# Patient Record
Sex: FEMALE | Race: WHITE | NOT HISPANIC OR LATINO | URBAN - METROPOLITAN AREA
[De-identification: names, ages, dates, MRNs, and addresses within clinical notes are randomized per-mention and may not be internally consistent; named-entity substitution may affect disease eponyms.]

---

## 2024-04-22 ENCOUNTER — INPATIENT (INPATIENT)
Facility: HOSPITAL | Age: 64
LOS: 0 days | Discharge: HOME | DRG: 282 | End: 2024-04-23
Attending: INTERNAL MEDICINE | Admitting: INTERNAL MEDICINE
Payer: COMMERCIAL

## 2024-04-22 VITALS
HEART RATE: 77 BPM | OXYGEN SATURATION: 96 % | SYSTOLIC BLOOD PRESSURE: 139 MMHG | HEIGHT: 65 IN | WEIGHT: 143.08 LBS | TEMPERATURE: 98 F | DIASTOLIC BLOOD PRESSURE: 84 MMHG | RESPIRATION RATE: 19 BRPM

## 2024-04-22 DIAGNOSIS — I21.4 NON-ST ELEVATION (NSTEMI) MYOCARDIAL INFARCTION: ICD-10-CM

## 2024-04-22 LAB
APTT BLD: 41.3 SEC — HIGH (ref 24.5–35.6)
HCT VFR BLD CALC: 35.2 % — LOW (ref 39–50)
HGB BLD-MCNC: 11.4 G/DL — LOW (ref 13–17)
INR BLD: 1.01 RATIO — SIGNIFICANT CHANGE UP (ref 0.85–1.18)
MCHC RBC-ENTMCNC: 29.3 PG — SIGNIFICANT CHANGE UP (ref 27–34)
MCHC RBC-ENTMCNC: 32.4 GM/DL — SIGNIFICANT CHANGE UP (ref 32–36)
MCV RBC AUTO: 90.5 FL — SIGNIFICANT CHANGE UP (ref 80–100)
PLATELET # BLD AUTO: 272 K/UL — SIGNIFICANT CHANGE UP (ref 150–400)
PROTHROM AB SERPL-ACNC: 11.4 SEC — SIGNIFICANT CHANGE UP (ref 9.5–13)
RBC # BLD: 3.89 M/UL — LOW (ref 4.2–5.8)
RBC # FLD: 12.6 % — SIGNIFICANT CHANGE UP (ref 10.3–14.5)
WBC # BLD: 7.43 K/UL — SIGNIFICANT CHANGE UP (ref 3.8–10.5)
WBC # FLD AUTO: 7.43 K/UL — SIGNIFICANT CHANGE UP (ref 3.8–10.5)

## 2024-04-22 PROCEDURE — C1769: CPT

## 2024-04-22 PROCEDURE — 80061 LIPID PANEL: CPT

## 2024-04-22 PROCEDURE — 86803 HEPATITIS C AB TEST: CPT

## 2024-04-22 PROCEDURE — C1887: CPT

## 2024-04-22 PROCEDURE — 83735 ASSAY OF MAGNESIUM: CPT

## 2024-04-22 PROCEDURE — 93005 ELECTROCARDIOGRAM TRACING: CPT

## 2024-04-22 PROCEDURE — 93010 ELECTROCARDIOGRAM REPORT: CPT

## 2024-04-22 PROCEDURE — 85027 COMPLETE CBC AUTOMATED: CPT

## 2024-04-22 PROCEDURE — 85730 THROMBOPLASTIN TIME PARTIAL: CPT

## 2024-04-22 PROCEDURE — 85025 COMPLETE CBC W/AUTO DIFF WBC: CPT

## 2024-04-22 PROCEDURE — 93458 L HRT ARTERY/VENTRICLE ANGIO: CPT

## 2024-04-22 PROCEDURE — 84443 ASSAY THYROID STIM HORMONE: CPT

## 2024-04-22 PROCEDURE — 36415 COLL VENOUS BLD VENIPUNCTURE: CPT

## 2024-04-22 PROCEDURE — C1894: CPT

## 2024-04-22 PROCEDURE — 85610 PROTHROMBIN TIME: CPT

## 2024-04-22 PROCEDURE — 86900 BLOOD TYPING SEROLOGIC ABO: CPT

## 2024-04-22 PROCEDURE — 71045 X-RAY EXAM CHEST 1 VIEW: CPT

## 2024-04-22 PROCEDURE — 86850 RBC ANTIBODY SCREEN: CPT

## 2024-04-22 PROCEDURE — 84100 ASSAY OF PHOSPHORUS: CPT

## 2024-04-22 PROCEDURE — 84484 ASSAY OF TROPONIN QUANT: CPT

## 2024-04-22 PROCEDURE — 80053 COMPREHEN METABOLIC PANEL: CPT

## 2024-04-22 PROCEDURE — 80048 BASIC METABOLIC PNL TOTAL CA: CPT

## 2024-04-22 PROCEDURE — 82550 ASSAY OF CK (CPK): CPT

## 2024-04-22 PROCEDURE — 86901 BLOOD TYPING SEROLOGIC RH(D): CPT

## 2024-04-22 PROCEDURE — 83036 HEMOGLOBIN GLYCOSYLATED A1C: CPT

## 2024-04-22 PROCEDURE — 71045 X-RAY EXAM CHEST 1 VIEW: CPT | Mod: 26

## 2024-04-22 RX ORDER — ASPIRIN/CALCIUM CARB/MAGNESIUM 324 MG
81 TABLET ORAL DAILY
Refills: 0 | Status: DISCONTINUED | OUTPATIENT
Start: 2024-04-22 | End: 2024-04-23

## 2024-04-22 RX ORDER — ACETAMINOPHEN 500 MG
1000 TABLET ORAL ONCE
Refills: 0 | Status: COMPLETED | OUTPATIENT
Start: 2024-04-22 | End: 2024-04-23

## 2024-04-22 RX ORDER — HEPARIN SODIUM 5000 [USP'U]/ML
4700 INJECTION INTRAVENOUS; SUBCUTANEOUS EVERY 6 HOURS
Refills: 0 | Status: DISCONTINUED | OUTPATIENT
Start: 2024-04-22 | End: 2024-04-23

## 2024-04-22 RX ORDER — ATORVASTATIN CALCIUM 80 MG/1
80 TABLET, FILM COATED ORAL AT BEDTIME
Refills: 0 | Status: DISCONTINUED | OUTPATIENT
Start: 2024-04-23 | End: 2024-04-23

## 2024-04-22 RX ORDER — HEPARIN SODIUM 5000 [USP'U]/ML
4700 INJECTION INTRAVENOUS; SUBCUTANEOUS ONCE
Refills: 0 | Status: DISCONTINUED | OUTPATIENT
Start: 2024-04-22 | End: 2024-04-23

## 2024-04-22 RX ORDER — SERTRALINE 25 MG/1
100 TABLET, FILM COATED ORAL DAILY
Refills: 0 | Status: DISCONTINUED | OUTPATIENT
Start: 2024-04-22 | End: 2024-04-23

## 2024-04-22 RX ORDER — MORPHINE SULFATE 50 MG/1
2 CAPSULE, EXTENDED RELEASE ORAL ONCE
Refills: 0 | Status: DISCONTINUED | OUTPATIENT
Start: 2024-04-22 | End: 2024-04-23

## 2024-04-22 RX ORDER — LEVOTHYROXINE SODIUM 125 MCG
75 TABLET ORAL DAILY
Refills: 0 | Status: DISCONTINUED | OUTPATIENT
Start: 2024-04-23 | End: 2024-04-23

## 2024-04-22 RX ORDER — HEPARIN SODIUM 5000 [USP'U]/ML
INJECTION INTRAVENOUS; SUBCUTANEOUS
Qty: 25000 | Refills: 0 | Status: DISCONTINUED | OUTPATIENT
Start: 2024-04-22 | End: 2024-04-23

## 2024-04-22 NOTE — H&P ADULT - ASSESSMENT
A:    64yF    Here for:    1. NSTEMI    This patient requires a higher level of care due to the complexity and severity of their condition which increases the amount and complexity of data to be reviewed and analyzed in addition to the risk of complications, morbidity and mortality of patient management    P:    NSTEMI  XF from outside hospital  Cards aware, tentative plan for catheterization tomorrow    STAT labs, CXR  Baseline EKG w/o KACEY or STEMI equivalents  Antiplatelets; try to ascertain what medications she rec'd at outside hospital  Morphine 2mg IV x 1, ofirmev for pain relief  Trend troponins  AM lipid panel, HbA1c  No known hx of heart disease; never had echo or catheterization  Hx of HTN and HLD, not on any medications for this  Heparin drip  Minimize invasive lines/catheters as much as possible  Replete lytes PRN    Dispo: Cont care in CCU. Ischemic w/u per Cardiology.     Time spent on this patient encounter: 75+ minutes    Date of entry of this note is equal to the date of services rendered.    This includes assessment of the presenting problems with associated risks, reviewing the medical record to prepare for the encounter and meeting face to face with the patient to obtain additional history and conduct a focused examination I have also performed an appropriate physical exam, made interventions listed and ordered and interpreted appropriate diagnostic studies as documented. This also included communication and coordination of care with the multidisciplinary team including the bedside nurse, appropriate attending of record and consultants as needed.         A:    64yF    Here for:    1. NSTEMI    This patient requires a higher level of care due to the complexity and severity of their condition which increases the amount and complexity of data to be reviewed and analyzed in addition to the risk of complications, morbidity and mortality of patient management    P:    NSTEMI  XF from outside hospital  Cards aware, tentative plan for catheterization tomorrow    STAT labs, CXR  Baseline EKG w/o KACEY or STEMI equivalents  ASA 325m plavix 300mg at outside hospital; cont ASA 81mg PO qd  Morphine 2mg IV x 1, ofirmev for pain relief  Trend troponins  AM lipid panel, HbA1c  No known hx of heart disease; never had echo or catheterization  Hx of HTN and HLD, not on any medications for this  Heparin drip  Minimize invasive lines/catheters as much as possible  Replete lytes PRN    Dispo: Cont care in CCU. Ischemic w/u per Cardiology.     Time spent on this patient encounter: 75+ minutes    Date of entry of this note is equal to the date of services rendered.    This includes assessment of the presenting problems with associated risks, reviewing the medical record to prepare for the encounter and meeting face to face with the patient to obtain additional history and conduct a focused examination I have also performed an appropriate physical exam, made interventions listed and ordered and interpreted appropriate diagnostic studies as documented. This also included communication and coordination of care with the multidisciplinary team including the bedside nurse, appropriate attending of record and consultants as needed.

## 2024-04-22 NOTE — H&P ADULT - HISTORY OF PRESENT ILLNESS
S:    Pt seen and examined  HD # 1  PMHx HTN, HLD, fibromyalgia  Pt presented to [facility] with c/o of chest pain; Dx NSTEMI, no interventionalist on call, XF to   Here states CP resolved, c/o of upper back pain and HA; she rec'd a nitroglycerin oral at [facility]

## 2024-04-22 NOTE — PATIENT PROFILE ADULT - FALL HARM RISK - UNIVERSAL INTERVENTIONS
Bed in lowest position, wheels locked, appropriate side rails in place/Call bell, personal items and telephone in reach/Instruct patient to call for assistance before getting out of bed or chair/Non-slip footwear when patient is out of bed/[place] to call system/Physically safe environment - no spills, clutter or unnecessary equipment/Purposeful Proactive Rounding/Room/bathroom lighting operational, light cord in reach

## 2024-04-22 NOTE — PATIENT PROFILE ADULT - STATED REASON FOR ADMISSION
"I was making coffee and all of a sudden had pain on left side of my chest and in the back and my left shoulder started feeling weird, tight"

## 2024-04-23 ENCOUNTER — TRANSCRIPTION ENCOUNTER (OUTPATIENT)
Age: 64
End: 2024-04-23

## 2024-04-23 VITALS — TEMPERATURE: 98 F

## 2024-04-23 DIAGNOSIS — I21.4 NON-ST ELEVATION (NSTEMI) MYOCARDIAL INFARCTION: ICD-10-CM

## 2024-04-23 LAB
A1C WITH ESTIMATED AVERAGE GLUCOSE RESULT: 5.8 % — HIGH (ref 4–5.6)
ABO RH CONFIRMATION: SIGNIFICANT CHANGE UP
ALBUMIN SERPL ELPH-MCNC: 3.5 G/DL — SIGNIFICANT CHANGE UP (ref 3.3–5)
ALP SERPL-CCNC: 65 U/L — SIGNIFICANT CHANGE UP (ref 40–120)
ALT FLD-CCNC: 23 U/L — SIGNIFICANT CHANGE UP (ref 12–78)
ANION GAP SERPL CALC-SCNC: 5 MMOL/L — SIGNIFICANT CHANGE UP (ref 5–17)
ANION GAP SERPL CALC-SCNC: 5 MMOL/L — SIGNIFICANT CHANGE UP (ref 5–17)
APTT BLD: 58.9 SEC — HIGH (ref 24.5–35.6)
APTT BLD: 88.9 SEC — HIGH (ref 24.5–35.6)
AST SERPL-CCNC: 52 U/L — HIGH (ref 15–37)
BASOPHILS # BLD AUTO: 0.02 K/UL — SIGNIFICANT CHANGE UP (ref 0–0.2)
BASOPHILS NFR BLD AUTO: 0.3 % — SIGNIFICANT CHANGE UP (ref 0–2)
BILIRUB SERPL-MCNC: 0.5 MG/DL — SIGNIFICANT CHANGE UP (ref 0.2–1.2)
BLD GP AB SCN SERPL QL: SIGNIFICANT CHANGE UP
BUN SERPL-MCNC: 11 MG/DL — SIGNIFICANT CHANGE UP (ref 7–23)
BUN SERPL-MCNC: 12 MG/DL — SIGNIFICANT CHANGE UP (ref 7–23)
CALCIUM SERPL-MCNC: 9.2 MG/DL — SIGNIFICANT CHANGE UP (ref 8.5–10.1)
CALCIUM SERPL-MCNC: 9.3 MG/DL — SIGNIFICANT CHANGE UP (ref 8.5–10.1)
CHLORIDE SERPL-SCNC: 109 MMOL/L — HIGH (ref 96–108)
CHLORIDE SERPL-SCNC: 109 MMOL/L — HIGH (ref 96–108)
CHOLEST SERPL-MCNC: 237 MG/DL — HIGH
CK SERPL-CCNC: 327 U/L — HIGH (ref 26–308)
CO2 SERPL-SCNC: 27 MMOL/L — SIGNIFICANT CHANGE UP (ref 22–31)
CO2 SERPL-SCNC: 27 MMOL/L — SIGNIFICANT CHANGE UP (ref 22–31)
CREAT SERPL-MCNC: 0.7 MG/DL — SIGNIFICANT CHANGE UP (ref 0.5–1.3)
CREAT SERPL-MCNC: 0.7 MG/DL — SIGNIFICANT CHANGE UP (ref 0.5–1.3)
EGFR: 104 ML/MIN/1.73M2 — SIGNIFICANT CHANGE UP
EGFR: 104 ML/MIN/1.73M2 — SIGNIFICANT CHANGE UP
EOSINOPHIL # BLD AUTO: 0.08 K/UL — SIGNIFICANT CHANGE UP (ref 0–0.5)
EOSINOPHIL NFR BLD AUTO: 1.3 % — SIGNIFICANT CHANGE UP (ref 0–6)
ESTIMATED AVERAGE GLUCOSE: 120 MG/DL — HIGH (ref 68–114)
GLUCOSE SERPL-MCNC: 103 MG/DL — HIGH (ref 70–99)
GLUCOSE SERPL-MCNC: 120 MG/DL — HIGH (ref 70–99)
HCT VFR BLD CALC: 36.2 % — LOW (ref 39–50)
HCV AB S/CO SERPL IA: 0.17 S/CO — SIGNIFICANT CHANGE UP (ref 0–0.99)
HCV AB SERPL-IMP: SIGNIFICANT CHANGE UP
HDLC SERPL-MCNC: 43 MG/DL — LOW
HGB BLD-MCNC: 11.7 G/DL — LOW (ref 13–17)
IMM GRANULOCYTES NFR BLD AUTO: 0.6 % — SIGNIFICANT CHANGE UP (ref 0–0.9)
LIPID PNL WITH DIRECT LDL SERPL: 157 MG/DL — HIGH
LYMPHOCYTES # BLD AUTO: 1.91 K/UL — SIGNIFICANT CHANGE UP (ref 1–3.3)
LYMPHOCYTES # BLD AUTO: 30.6 % — SIGNIFICANT CHANGE UP (ref 13–44)
MAGNESIUM SERPL-MCNC: 2 MG/DL — SIGNIFICANT CHANGE UP (ref 1.6–2.6)
MCHC RBC-ENTMCNC: 29.4 PG — SIGNIFICANT CHANGE UP (ref 27–34)
MCHC RBC-ENTMCNC: 32.3 GM/DL — SIGNIFICANT CHANGE UP (ref 32–36)
MCV RBC AUTO: 91 FL — SIGNIFICANT CHANGE UP (ref 80–100)
MONOCYTES # BLD AUTO: 0.42 K/UL — SIGNIFICANT CHANGE UP (ref 0–0.9)
MONOCYTES NFR BLD AUTO: 6.7 % — SIGNIFICANT CHANGE UP (ref 2–14)
NEUTROPHILS # BLD AUTO: 3.78 K/UL — SIGNIFICANT CHANGE UP (ref 1.8–7.4)
NEUTROPHILS NFR BLD AUTO: 60.5 % — SIGNIFICANT CHANGE UP (ref 43–77)
NON HDL CHOLESTEROL: 194 MG/DL — HIGH
PHOSPHATE SERPL-MCNC: 2.9 MG/DL — SIGNIFICANT CHANGE UP (ref 2.5–4.5)
PLATELET # BLD AUTO: 273 K/UL — SIGNIFICANT CHANGE UP (ref 150–400)
POTASSIUM SERPL-MCNC: 3.4 MMOL/L — LOW (ref 3.5–5.3)
POTASSIUM SERPL-MCNC: 4.3 MMOL/L — SIGNIFICANT CHANGE UP (ref 3.5–5.3)
POTASSIUM SERPL-SCNC: 3.4 MMOL/L — LOW (ref 3.5–5.3)
POTASSIUM SERPL-SCNC: 4.3 MMOL/L — SIGNIFICANT CHANGE UP (ref 3.5–5.3)
PROT SERPL-MCNC: 7 GM/DL — SIGNIFICANT CHANGE UP (ref 6–8.3)
RBC # BLD: 3.98 M/UL — LOW (ref 4.2–5.8)
RBC # FLD: 12.8 % — SIGNIFICANT CHANGE UP (ref 10.3–14.5)
SODIUM SERPL-SCNC: 141 MMOL/L — SIGNIFICANT CHANGE UP (ref 135–145)
SODIUM SERPL-SCNC: 141 MMOL/L — SIGNIFICANT CHANGE UP (ref 135–145)
TRIGL SERPL-MCNC: 202 MG/DL — HIGH
TROPONIN I, HIGH SENSITIVITY RESULT: 6468.51 NG/L — HIGH
TROPONIN I, HIGH SENSITIVITY RESULT: 8608.02 NG/L — HIGH
TSH SERPL-MCNC: 7.31 UU/ML — HIGH (ref 0.34–4.82)
WBC # BLD: 6.25 K/UL — SIGNIFICANT CHANGE UP (ref 3.8–10.5)
WBC # FLD AUTO: 6.25 K/UL — SIGNIFICANT CHANGE UP (ref 3.8–10.5)

## 2024-04-23 PROCEDURE — 99233 SBSQ HOSP IP/OBS HIGH 50: CPT

## 2024-04-23 PROCEDURE — 93458 L HRT ARTERY/VENTRICLE ANGIO: CPT | Mod: 26

## 2024-04-23 PROCEDURE — 99291 CRITICAL CARE FIRST HOUR: CPT | Mod: 24

## 2024-04-23 PROCEDURE — 99152 MOD SED SAME PHYS/QHP 5/>YRS: CPT | Mod: 59

## 2024-04-23 RX ORDER — PITAVASTATIN CALCIUM 1.04 MG/1
1 TABLET, FILM COATED ORAL
Refills: 0 | DISCHARGE

## 2024-04-23 RX ORDER — CHLORHEXIDINE GLUCONATE 213 G/1000ML
1 SOLUTION TOPICAL
Refills: 0 | Status: DISCONTINUED | OUTPATIENT
Start: 2024-04-23 | End: 2024-04-23

## 2024-04-23 RX ORDER — METOPROLOL TARTRATE 50 MG
12.5 TABLET ORAL
Refills: 0 | Status: DISCONTINUED | OUTPATIENT
Start: 2024-04-23 | End: 2024-04-23

## 2024-04-23 RX ORDER — LEVOTHYROXINE SODIUM 125 MCG
1 TABLET ORAL
Refills: 0 | DISCHARGE

## 2024-04-23 RX ORDER — HEPARIN SODIUM 5000 [USP'U]/ML
3800 INJECTION INTRAVENOUS; SUBCUTANEOUS EVERY 6 HOURS
Refills: 0 | Status: DISCONTINUED | OUTPATIENT
Start: 2024-04-23 | End: 2024-04-23

## 2024-04-23 RX ORDER — POTASSIUM CHLORIDE 20 MEQ
10 PACKET (EA) ORAL
Refills: 0 | Status: DISCONTINUED | OUTPATIENT
Start: 2024-04-23 | End: 2024-04-23

## 2024-04-23 RX ORDER — SODIUM CHLORIDE 9 MG/ML
1000 INJECTION INTRAMUSCULAR; INTRAVENOUS; SUBCUTANEOUS
Refills: 0 | Status: DISCONTINUED | OUTPATIENT
Start: 2024-04-23 | End: 2024-04-23

## 2024-04-23 RX ORDER — HEPARIN SODIUM 5000 [USP'U]/ML
3800 INJECTION INTRAVENOUS; SUBCUTANEOUS ONCE
Refills: 0 | Status: COMPLETED | OUTPATIENT
Start: 2024-04-23 | End: 2024-04-23

## 2024-04-23 RX ORDER — CLOPIDOGREL BISULFATE 75 MG/1
300 TABLET, FILM COATED ORAL ONCE
Refills: 0 | Status: COMPLETED | OUTPATIENT
Start: 2024-04-23 | End: 2024-04-23

## 2024-04-23 RX ORDER — AMLODIPINE BESYLATE 2.5 MG/1
5 TABLET ORAL DAILY
Refills: 0 | Status: DISCONTINUED | OUTPATIENT
Start: 2024-04-23 | End: 2024-04-23

## 2024-04-23 RX ORDER — AMLODIPINE BESYLATE 2.5 MG/1
1 TABLET ORAL
Qty: 30 | Refills: 3
Start: 2024-04-23

## 2024-04-23 RX ORDER — POTASSIUM CHLORIDE 20 MEQ
40 PACKET (EA) ORAL EVERY 4 HOURS
Refills: 0 | Status: COMPLETED | OUTPATIENT
Start: 2024-04-23 | End: 2024-04-23

## 2024-04-23 RX ORDER — SERTRALINE 25 MG/1
1 TABLET, FILM COATED ORAL
Qty: 0 | Refills: 0 | DISCHARGE

## 2024-04-23 RX ORDER — SODIUM CHLORIDE 9 MG/ML
250 INJECTION INTRAMUSCULAR; INTRAVENOUS; SUBCUTANEOUS ONCE
Refills: 0 | Status: DISCONTINUED | OUTPATIENT
Start: 2024-04-23 | End: 2024-04-23

## 2024-04-23 RX ORDER — HEPARIN SODIUM 5000 [USP'U]/ML
INJECTION INTRAVENOUS; SUBCUTANEOUS
Qty: 25000 | Refills: 0 | Status: DISCONTINUED | OUTPATIENT
Start: 2024-04-23 | End: 2024-04-23

## 2024-04-23 RX ADMIN — HEPARIN SODIUM 3800 UNIT(S): 5000 INJECTION INTRAVENOUS; SUBCUTANEOUS at 01:10

## 2024-04-23 RX ADMIN — HEPARIN SODIUM 0 UNIT(S)/HR: 5000 INJECTION INTRAVENOUS; SUBCUTANEOUS at 08:08

## 2024-04-23 RX ADMIN — MORPHINE SULFATE 2 MILLIGRAM(S): 50 CAPSULE, EXTENDED RELEASE ORAL at 00:55

## 2024-04-23 RX ADMIN — HEPARIN SODIUM 750 UNIT(S)/HR: 5000 INJECTION INTRAVENOUS; SUBCUTANEOUS at 07:08

## 2024-04-23 RX ADMIN — Medication 40 MILLIEQUIVALENT(S): at 05:15

## 2024-04-23 RX ADMIN — MORPHINE SULFATE 2 MILLIGRAM(S): 50 CAPSULE, EXTENDED RELEASE ORAL at 02:00

## 2024-04-23 RX ADMIN — AMLODIPINE BESYLATE 5 MILLIGRAM(S): 2.5 TABLET ORAL at 17:59

## 2024-04-23 RX ADMIN — Medication 40 MILLIEQUIVALENT(S): at 02:11

## 2024-04-23 RX ADMIN — HEPARIN SODIUM 750 UNIT(S)/HR: 5000 INJECTION INTRAVENOUS; SUBCUTANEOUS at 01:01

## 2024-04-23 RX ADMIN — HEPARIN SODIUM 650 UNIT(S)/HR: 5000 INJECTION INTRAVENOUS; SUBCUTANEOUS at 08:40

## 2024-04-23 RX ADMIN — SERTRALINE 100 MILLIGRAM(S): 25 TABLET, FILM COATED ORAL at 09:56

## 2024-04-23 RX ADMIN — Medication 75 MICROGRAM(S): at 05:15

## 2024-04-23 RX ADMIN — Medication 12.5 MILLIGRAM(S): at 09:28

## 2024-04-23 RX ADMIN — CLOPIDOGREL BISULFATE 300 MILLIGRAM(S): 75 TABLET, FILM COATED ORAL at 09:29

## 2024-04-23 RX ADMIN — Medication 81 MILLIGRAM(S): at 09:29

## 2024-04-23 RX ADMIN — SODIUM CHLORIDE 130 MILLILITER(S): 9 INJECTION INTRAMUSCULAR; INTRAVENOUS; SUBCUTANEOUS at 17:10

## 2024-04-23 RX ADMIN — Medication 1000 MILLIGRAM(S): at 02:00

## 2024-04-23 RX ADMIN — Medication 400 MILLIGRAM(S): at 01:14

## 2024-04-23 NOTE — DISCHARGE NOTE NURSING/CASE MANAGEMENT/SOCIAL WORK - NSDCPEFALRISK_GEN_ALL_CORE
For information on Fall & Injury Prevention, visit: [email or web address] OR  [email or web address] OR  https://www.cdc.gov/steadi/patient.html

## 2024-04-23 NOTE — DISCHARGE NOTE PROVIDER - NSDCFUADDINST_GEN_ALL_CORE_FT
No submerging hand in water for 5 days, you may shower tomorrow afternoon.   No swimming, hot tub for 5 days  Prior to showering, remove bandage. Do not scrub procedure site, pat dry if it gets wet  you may cover it with a small bandaid.   Follow up with the cardiologist of your choice within two weeks, I have given you the cards of Dr Pena and Dr Palla   Because you received sedation for the next 24 hours:  do not drive   Do not make important decisions or operate heavy machinery   Do not drink alcohol or use illicit drugs

## 2024-04-23 NOTE — PROGRESS NOTE ADULT - SUBJECTIVE AND OBJECTIVE BOX
Nurse Practitioner Progress note:     HPI:  64 y/r old f with PMH HLD, HTN,. fibromyalgia presented to [facility] with c/o of chest pain; Dx NSTEMI, no interventionalist on call, XF to   Here states CP resolved, c/o of upper back pain and HA; she rec'd a nitroglycerin oral at [facility]   (22 Apr 2024 23:22)    4/23/24 Dr Leigh accepted pt from [facility] for NSTEMI with a peak troponin of 8600 now downtrending on heparin gtts. Pt seen in CCU, pt is A&Ox3, states that her chest pain and back pain has improved since being admitted to . Pt had chest pain  and upper back pain with left arm pain that made her go to the hospital.  No other complaints at this time. Pt is agreeable to procedure today for ischemic evaluation.    (22 Apr 2024 23:22)    S/P LHC: normal cors, coronary spasm        T(C): 36.2 (04-23-24 @ 14:39), Max: 36.9 (04-22-24 @ 22:35)  HR: 66 (04-23-24 @ 14:39) (60 - 77)  BP: 153/99 (04-23-24 @ 14:39) (100/68 - 153/99)  RR: 15 (04-23-24 @ 14:39) (12 - 28)  SpO2: 96% (04-23-24 @ 14:39) (91% - 97%)  Wt(kg): --        PHYSICAL EXAM:  NEURO: Non-focal, AxOx3.  No neuro deficits NECK: Supple, No JVD, No LAD  CHEST/LUNG: Clear to auscultation bilaterally; No wheeze  HEART: s1 s2 Regular rate and rhythm; No murmurs, rubs, or gallops  ABDOMEN: Soft, Nontender, Nondistended; Bowel sounds present X 4 quadrants   EXTREMITIES:  2+ Peripheral Pulses, No clubbing, cyanosis, or edema   VASCULAR: Peripheral pulses palpable 2+ bilaterally  PROCEDURE SITE: right band removed one hour post placement ,Site is without hematoma or bleeding. Sensation and ARTURO intact. Distal pulses palpable 2+, capillary refill < 2 seconds. Patient denies pain, numbness, tingling, CP or SOB. Clean dry dressing applied     PROCEDURE RESULTS: full report to follow     ASSESSMENT:   Dr Leigh accepted pt from [facility] for NSTEMI with a peak troponin of 8600 now downtrending on heparin gtts. Pt seen in CCU, pt is A&Ox3, states that her chest pain and back pain has improved since being admitted to . Pt had chest pain  and upper back pain with left arm pain that made her go to the hospital.  No other complaints at this time. Pt is agreeable to procedure today for ischemic evaluation.    (22 Apr 2024 23:22)    S/P LHC: normal cors, coronary spasm      PLAN:  -VS, diet, activity as per post cath orders  -JOSELIN post hydration  -DC heparin   -add Amlodipine 5mg daily  -Plan of care discussed with patient and Dr Leigh  -Post cath instructions reviewed, patient verbalizes and understands instructions  Remainder of care to be addressed by primary team          
  CC:    HPI:  Pt presented to [facility] with c/o of chest pain; Dx NSTEMI, no interventionalist on call, transferred to  for [facility].      4/23: No events over night.  For [facility]          PAST MEDICAL & SURGICAL HISTORY:  HTN (hypertension)      HLD (hyperlipidemia)      H/O fibromyalgia          FAMILY HISTORY:      Social Hx:    Allergies    No Known Allergies    Intolerances        Height (cm): 165.1 (04-22 @ 22:35)  Weight (kg): 64.9 (04-23 @ 00:33)  BMI (kg/m2): 23.8 (04-23 @ 00:33)    ICU Vital Signs Last 24 Hrs  T(C): 36.4 (23 Apr 2024 05:00), Max: 36.9 (22 Apr 2024 22:35)  T(F): 97.5 (23 Apr 2024 05:00), Max: 98.4 (22 Apr 2024 22:35)  HR: 69 (23 Apr 2024 09:00) (65 - 77)  BP: 128/76 (23 Apr 2024 09:00) (100/68 - 146/92)  BP(mean): 93 (23 Apr 2024 09:00) (80 - 110)  ABP: --  ABP(mean): --  RR: 12 (23 Apr 2024 09:00) (12 - 28)  SpO2: 97% (23 Apr 2024 09:00) (91% - 97%)    O2 Parameters below as of 23 Apr 2024 09:00  Patient On (Oxygen Delivery Method): room air                I&O's Summary    22 Apr 2024 07:01  -  23 Apr 2024 07:00  --------------------------------------------------------  IN: 100 mL / OUT: 0 mL / NET: 100 mL                              11.7   6.25  )-----------( 273      ( 23 Apr 2024 07:28 )             36.2       04-23    141  |  109<H>  |  11  ----------------------------<  103<H>  4.3   |  27  |  0.70    Ca    9.2      23 Apr 2024 07:28  Phos  2.9     04-22  Mg     2.0     04-22    TPro  7.0  /  Alb  3.5  /  TBili  0.5  /  DBili  x   /  AST  52<H>  /  ALT  23  /  AlkPhos  65  04-22      CARDIAC MARKERS ( 22 Apr 2024 23:26 )  x     / x     / 327 U/L / x     / x                Urinalysis Basic - ( 23 Apr 2024 07:28 )    Color: x / Appearance: x / SG: x / pH: x  Gluc: 103 mg/dL / Ketone: x  / Bili: x / Urobili: x   Blood: x / Protein: x / Nitrite: x   Leuk Esterase: x / RBC: x / WBC x   Sq Epi: x / Non Sq Epi: x / Bacteria: x        MEDICATIONS  (STANDING):  aspirin  chewable 81 milliGRAM(s) Oral daily  atorvastatin 80 milliGRAM(s) Oral at bedtime  heparin  Infusion.  Unit(s)/Hr (7.5 mL/Hr) IV Continuous <Continuous>  levothyroxine 75 MICROGram(s) Oral daily  metoprolol tartrate 12.5 milliGRAM(s) Oral two times a day  sertraline 100 milliGRAM(s) Oral daily  sodium chloride 0.9% Bolus 250 milliLiter(s) IV Bolus once    MEDICATIONS  (PRN):  heparin   Injectable 3800 Unit(s) IV Push every 6 hours PRN For aPTT less than 40      DVT Prophylaxis: UFH    Advanced Directives:  Discussed with:    Visit Information:    ** Time is exclusive of billed procedures and/or teaching and/or routine family updates.

## 2024-04-23 NOTE — DISCHARGE NOTE PROVIDER - NSDCCONDITION_GEN_ALL_CORE
Spoke to pt in regards to if pt viewed results on portal as message was sent that pt has not viewed yet. Pt advised is aware of lab results and instructions. Pt verbalized understanding with no further questions or concerns.    Stable

## 2024-04-23 NOTE — DISCHARGE NOTE PROVIDER - CARE PROVIDER_API CALL
Gissel Pena  Cardiology  [address] [place] [id number]  Phone: [phone]  Fax: [phone]  Follow Up Time:

## 2024-04-23 NOTE — CONSULT NOTE ADULT - ASSESSMENT
Pt with NSTEMI, chest pain/back pain now resolved, currently  on heparin gtts will be brought to the cath lab for ischemic evaluation.    Pt with NSTEMI, chest pain/back pain now resolved, currently  on heparin gtts will be brought to the cath lab for ischemic evaluation.     ASA class: II  Creatinine: 0.70  GFR: 104  Bleeding  Risk score: 2.5%  Gurinder Score:  1pt

## 2024-04-23 NOTE — PROGRESS NOTE ADULT - ASSESSMENT
A/P: 64 female presenting with a NSTEMI and going for a LHC  HTN  HLD    Plan:  CCU  For C  Continue UFH, ASA, BBX, Statin  Synthroid for hypothyroidism  NPO  SQH DVT Prophylaxis

## 2024-04-23 NOTE — DISCHARGE NOTE PROVIDER - HOSPITAL COURSE
Pt transferred to [facility] CCU from [facility] for NSTEMI with chest pain.   Cardiac cath performed on 4/23/24. normal cors with possible arterial spasm  IV hydration was given  Heparin gtts   treatment for HLD, hypothryroidism, fibromyalgia continued  Amlodipine started for coronary spasm  discussions on lifestyle changes, education on cardiac health provided  Pt will be discharged from CCU to home

## 2024-04-23 NOTE — DISCHARGE NOTE NURSING/CASE MANAGEMENT/SOCIAL WORK - PATIENT PORTAL LINK FT
You can access the FollowMyHealth Patient Portal offered by [facility] by registering at the following website: [email or web address]. By joining [employer]’s FollowMyHealth portal, you will also be able to view your health information using other applications (apps) compatible with our system.

## 2024-04-23 NOTE — DISCHARGE NOTE PROVIDER - NSDCMRMEDTOKEN_GEN_ALL_CORE_FT
amLODIPine 5 mg oral tablet: 1 tab(s) orally once a day  pitavastatin (as calcium) 1 mg oral tablet: 1 tab(s) orally once a day (at bedtime)  Synthroid 75 mcg (0.075 mg) oral tablet: 1 tab(s) orally once a day  Zoloft 100 mg oral tablet: 1 tab(s) orally once a day

## 2024-04-23 NOTE — CONSULT NOTE ADULT - SUBJECTIVE AND OBJECTIVE BOX
CHIEF COMPLAINT:  Patient is a 64y old  Female who presents with a chief complaint of NSTEMI (23 Apr 2024 09:46)    HPI:   PMHx HTN, HLD, fibromyalgia  Pt presented to [facility] with c/o of chest pain; Dx NSTEMI, no interventionalist on call, XF to   Here states CP resolved, c/o of upper back pain and HA; she rec'd a nitroglycerin oral at [facility]   (22 Apr 2024 23:22)    4/23/24 Dr Leigh accepted pt from [facility] for NSTEMI with a peak troponin of 8600 now downtrending on heparin gtts. Pt seen in CCU, pt is A&Ox3, states that her chest pain and back pain has improved since being admitted to . Pt had chest pain  and upper back pain with left arm pain that made her go to the hospital.  No other complaints at this time. Pt is agreeable to procedure today for ischemic evaluation.     MEDICATIONS  (STANDING):  aspirin  chewable 81 milliGRAM(s) Oral daily  atorvastatin 80 milliGRAM(s) Oral at bedtime  heparin  Infusion.  Unit(s)/Hr (7.5 mL/Hr) IV Continuous <Continuous>  levothyroxine 75 MICROGram(s) Oral daily  metoprolol tartrate 12.5 milliGRAM(s) Oral two times a day  sertraline 100 milliGRAM(s) Oral daily  sodium chloride 0.9% Bolus 250 milliLiter(s) IV Bolus once    MEDICATIONS  (PRN):  heparin   Injectable 3800 Unit(s) IV Push every 6 hours PRN For aPTT less than 40      PHYSICAL EXAM:  Vital Signs Last 24 Hrs  T(C): 36.4 (23 Apr 2024 05:00), Max: 36.9 (22 Apr 2024 22:35)  T(F): 97.5 (23 Apr 2024 05:00), Max: 98.4 (22 Apr 2024 22:35)  HR: 70 (23 Apr 2024 10:00) (65 - 77)  BP: 123/82 (23 Apr 2024 10:00) (100/68 - 146/92)  BP(mean): 95 (23 Apr 2024 10:00) (80 - 110)  RR: 14 (23 Apr 2024 10:00) (12 - 28)  SpO2: 97% (23 Apr 2024 10:00) (91% - 97%)    Parameters below as of 23 Apr 2024 10:00  Patient On (Oxygen Delivery Method): room air        Constitutional: NAD, awake and alert  HEENT: PERR, EOMI, Normal Hearing, MMM  Neck: Soft and supple, No LAD, No JVD  Respiratory: Breath sounds are clear bilaterally, No wheezing, rales or rhonchi  Cardiovascular: S1 and S2, regular rate and rhythm, no Murmurs, gallops or rubs  Gastrointestinal: Bowel Sounds present, soft, nontender, nondistended, no guarding, no rebound  Extremities: No peripheral edema  Vascular: 2+ peripheral pulses  Neurological: A/O x 3, no focal deficits  Musculoskeletal: 5/5 strength b/l upper and lower extremities  Skin: No rashes    =======================================    ECG:      ========================================    LABS:                        11.7   6.25  )-----------( 273      ( 23 Apr 2024 07:28 )             36.2     04-23    141  |  109<H>  |  11  ----------------------------<  103<H>  4.3   |  27  |  0.70    Ca    9.2      23 Apr 2024 07:28  Phos  2.9     04-22  Mg     2.0     04-22    TPro  7.0  /  Alb  3.5  /  TBili  0.5  /  DBili  x   /  AST  52<H>  /  ALT  23  /  AlkPhos  65  04-22    CARDIAC MARKERS ( 22 Apr 2024 23:26 )  Troponin I, High Sensitivity Result: 8608.02 (04.22.24 @ 23:26)  Troponin I, High Sensitivity Result: 6468.51 (04.23.24 @ 07:28)    PT/INR - ( 22 Apr 2024 23:26 )   PT: 11.4 sec;   INR: 1.01 ratio         PTT - ( 23 Apr 2024 07:28 )  PTT:88.9 sec  Urinalysis Basic - ( 23 Apr 2024 07:28 )    Color: x / Appearance: x / SG: x / pH: x  Gluc: 103 mg/dL / Ketone: x  / Bili: x / Urobili: x   Blood: x / Protein: x / Nitrite: x   Leuk Esterase: x / RBC: x / WBC x   Sq Epi: x / Non Sq Epi: x / Bacteria: x      I&O's Summary    22 Apr 2024 07:01  -  23 Apr 2024 07:00  --------------------------------------------------------  IN: 100 mL / OUT: 0 mL / NET: 100 mL

## 2024-04-23 NOTE — CONSULT NOTE ADULT - PROBLEM SELECTOR RECOMMENDATION 9
a/w chest pain  admit to CCU  heparin gtts  Plavix 300mg to add to the 300mg already given at [facility]  asa loaded at [facility]  continue home meds  JOSELIN prehydration  C with possible PCI  -Consent obtained for cardiac catheterization w/ coronary angiogram, possible sedation and/or analgesia and possible stent placement. Pt is competent, has capacity, and understands risks and benefits of procedure. Risks and benefits discussed. Risk discussed included, but not limited to MI, stroke, mortality, major bleeding, arrythmia, or infection. All questions answered

## 2024-04-23 NOTE — DISCHARGE NOTE PROVIDER - NSDCCPCAREPLAN_GEN_ALL_CORE_FT
PRINCIPAL DISCHARGE DIAGNOSIS  Diagnosis: Coronary artery spasm  Assessment and Plan of Treatment: amlodipine 5mg, follow up with cardiologist      SECONDARY DISCHARGE DIAGNOSES  Diagnosis: HLD (hyperlipidemia)  Assessment and Plan of Treatment: continue home medication

## 2024-04-24 DIAGNOSIS — M79.7 FIBROMYALGIA: ICD-10-CM

## 2024-04-24 PROBLEM — E78.5 HYPERLIPIDEMIA, UNSPECIFIED: Chronic | Status: ACTIVE | Noted: 2024-04-22

## 2024-04-24 PROBLEM — I10 ESSENTIAL (PRIMARY) HYPERTENSION: Chronic | Status: ACTIVE | Noted: 2024-04-22

## 2024-04-24 PROBLEM — Z87.39 PERSONAL HISTORY OF OTHER DISEASES OF THE MUSCULOSKELETAL SYSTEM AND CONNECTIVE TISSUE: Chronic | Status: ACTIVE | Noted: 2024-04-22

## 2024-04-24 PROBLEM — Z00.00 ENCOUNTER FOR PREVENTIVE HEALTH EXAMINATION: Status: ACTIVE | Noted: 2024-04-24

## 2024-04-25 ENCOUNTER — APPOINTMENT (OUTPATIENT)
Dept: CARDIOLOGY | Facility: CLINIC | Age: 64
End: 2024-04-25
Payer: COMMERCIAL

## 2024-04-25 VITALS
OXYGEN SATURATION: 97 % | RESPIRATION RATE: 16 BRPM | HEART RATE: 105 BPM | BODY MASS INDEX: 32.96 KG/M2 | HEIGHT: 63 IN | TEMPERATURE: 98.2 F | DIASTOLIC BLOOD PRESSURE: 70 MMHG | WEIGHT: 186 LBS | SYSTOLIC BLOOD PRESSURE: 112 MMHG

## 2024-04-25 DIAGNOSIS — Z82.49 FAMILY HISTORY OF ISCHEMIC HEART DISEASE AND OTHER DISEASES OF THE CIRCULATORY SYSTEM: ICD-10-CM

## 2024-04-25 DIAGNOSIS — R06.83 SNORING: ICD-10-CM

## 2024-04-25 DIAGNOSIS — F32.A ANXIETY DISORDER, UNSPECIFIED: ICD-10-CM

## 2024-04-25 DIAGNOSIS — Z86.39 PERSONAL HISTORY OF OTHER ENDOCRINE, NUTRITIONAL AND METABOLIC DISEASE: ICD-10-CM

## 2024-04-25 DIAGNOSIS — F41.9 ANXIETY DISORDER, UNSPECIFIED: ICD-10-CM

## 2024-04-25 DIAGNOSIS — Z78.9 OTHER SPECIFIED HEALTH STATUS: ICD-10-CM

## 2024-04-25 DIAGNOSIS — R79.89 OTHER SPECIFIED ABNORMAL FINDINGS OF BLOOD CHEMISTRY: ICD-10-CM

## 2024-04-25 PROCEDURE — 93000 ELECTROCARDIOGRAM COMPLETE: CPT

## 2024-04-25 PROCEDURE — 99205 OFFICE O/P NEW HI 60 MIN: CPT | Mod: 25

## 2024-04-25 RX ORDER — ATENOLOL 25 MG/1
25 TABLET ORAL DAILY
Qty: 45 | Refills: 0 | Status: ACTIVE | COMMUNITY
Start: 2024-04-25 | End: 1900-01-01

## 2024-04-25 RX ORDER — ASPIRIN 81 MG
81 TABLET, DELAYED RELEASE (ENTERIC COATED) ORAL DAILY
Qty: 1 | Refills: 0 | Status: ACTIVE | COMMUNITY
Start: 2024-04-25

## 2024-04-25 NOTE — HISTORY OF PRESENT ILLNESS
[FreeTextEntry1] : 64 year old female with anxiety depression fibromyalgia ,hypertension , hyperlipidemia was not taking medication came to establish cardiac care  with complain that she had heart attack last monday , Patient apparently  3 days ago morning developed severe unusual chest pain started upper back ,felt of the chest after some time , was severe , patient felt anxious , patient was evaluated in [facility] , had CT chest with contrast , subsequent was transferred to [facility]  where she had cardiac cath showed minimal luminal irregularities of coronaries   possible coronary spasm causing her symptoms , patient was discharged , her pain resolved , the pain she had was different  from fibromyalgia symptoms .  hospital records reviewed from [facility]   Patient lately tired , exhausted  , insomnia lately , hx snoring ,  patient blood pressure is normal on amlodipine ,  patient was started pitivastatin 1 mg , ( patient was not taking any medications for HTN HLD prior to the event )   blood work in hospital     Hdl 43  , TSH 7.31 Hba1c 5.8   hemoglobin 11.7/36.2

## 2024-04-25 NOTE — ASSESSMENT
[FreeTextEntry1] : Patient with above  recent NSTEMI : likely vasospastic  minimal luminal irregularities : advised to take ecotrin , will give low dose atentolol 12.5 mg po daily , ecotrin 81 mg po daily , continue amlodpine 5 mg po daily , statin , will obtain her echo /CT chest repoert from [facility]  HTN:  controlled , continue low salt diet  norvasc 5 mg po daily   Hyperlipidemia : started on livalo 1 mg po daily , follow up blood work   Hypothyroidism   elevated TSH  Increase Synthroid to 100 mcg   fibromyalgia :with anxiety depression   continue medication

## 2024-04-26 ENCOUNTER — TRANSCRIPTION ENCOUNTER (OUTPATIENT)
Age: 64
End: 2024-04-26

## 2024-04-29 DIAGNOSIS — E03.9 HYPOTHYROIDISM, UNSPECIFIED: ICD-10-CM

## 2024-04-29 DIAGNOSIS — E78.5 HYPERLIPIDEMIA, UNSPECIFIED: ICD-10-CM

## 2024-04-29 DIAGNOSIS — I20.1 ANGINA PECTORIS WITH DOCUMENTED SPASM: ICD-10-CM

## 2024-04-29 DIAGNOSIS — I10 ESSENTIAL (PRIMARY) HYPERTENSION: ICD-10-CM

## 2024-04-29 DIAGNOSIS — M79.7 FIBROMYALGIA: ICD-10-CM

## 2024-04-29 DIAGNOSIS — I21.4 NON-ST ELEVATION (NSTEMI) MYOCARDIAL INFARCTION: ICD-10-CM

## 2024-04-29 DIAGNOSIS — Z79.890 HORMONE REPLACEMENT THERAPY: ICD-10-CM

## 2024-05-29 RX ORDER — MULTIVITAMIN
TABLET ORAL DAILY
Refills: 0 | Status: ACTIVE | COMMUNITY

## 2024-05-29 RX ORDER — SERTRALINE HYDROCHLORIDE 100 MG/1
100 TABLET, FILM COATED ORAL DAILY
Refills: 0 | Status: ACTIVE | COMMUNITY

## 2024-05-29 RX ORDER — UBIDECARENONE 200 MG
CAPSULE ORAL DAILY
Refills: 0 | Status: ACTIVE | COMMUNITY

## 2024-05-29 RX ORDER — AMLODIPINE BESYLATE 5 MG/1
5 TABLET ORAL DAILY
Refills: 0 | Status: ACTIVE | COMMUNITY

## 2024-05-29 RX ORDER — CHROMIUM 200 MCG
TABLET ORAL DAILY
Refills: 0 | Status: ACTIVE | COMMUNITY

## 2024-05-29 RX ORDER — LEVOTHYROXINE SODIUM 75 UG/1
75 TABLET ORAL DAILY
Refills: 0 | Status: ACTIVE | COMMUNITY

## 2024-05-29 RX ORDER — PITAVASTATIN CALCIUM 1 MG/1
1 TABLET ORAL
Refills: 0 | Status: ACTIVE | COMMUNITY

## 2024-05-29 RX ORDER — PNV NO.95/FERROUS FUM/FOLIC AC 28MG-0.8MG
TABLET ORAL DAILY
Refills: 0 | Status: ACTIVE | COMMUNITY

## 2024-05-30 ENCOUNTER — APPOINTMENT (OUTPATIENT)
Dept: CARDIOLOGY | Facility: CLINIC | Age: 64
End: 2024-05-30
Payer: COMMERCIAL

## 2024-05-30 ENCOUNTER — NON-APPOINTMENT (OUTPATIENT)
Age: 64
End: 2024-05-30

## 2024-05-30 VITALS
OXYGEN SATURATION: 97 % | DIASTOLIC BLOOD PRESSURE: 72 MMHG | SYSTOLIC BLOOD PRESSURE: 114 MMHG | HEART RATE: 70 BPM | BODY MASS INDEX: 31.89 KG/M2 | HEIGHT: 63 IN | WEIGHT: 180 LBS

## 2024-05-30 DIAGNOSIS — I10 ESSENTIAL (PRIMARY) HYPERTENSION: ICD-10-CM

## 2024-05-30 DIAGNOSIS — E78.2 MIXED HYPERLIPIDEMIA: ICD-10-CM

## 2024-05-30 DIAGNOSIS — I22.2 SUBSEQUENT NON-ST ELEVATION (NSTEMI) MYOCARDIAL INFARCTION: ICD-10-CM

## 2024-05-30 DIAGNOSIS — R07.89 OTHER CHEST PAIN: ICD-10-CM

## 2024-05-30 PROCEDURE — 93000 ELECTROCARDIOGRAM COMPLETE: CPT

## 2024-05-30 PROCEDURE — 99214 OFFICE O/P EST MOD 30 MIN: CPT | Mod: 25

## 2024-05-30 PROCEDURE — G2211 COMPLEX E/M VISIT ADD ON: CPT | Mod: NC

## 2024-05-30 NOTE — HISTORY OF PRESENT ILLNESS
[FreeTextEntry1] : 64 year old female with anxiety depression fibromyalgia ,hypertension , hyperlipidemia was not taking medication ,NSTEMI ,came for follow up    says she does have spams in chest last for few seconds ,  has chronic  back pain mild mid thoracic are for which she underwent  work up as per patient did see spine surgeon ,                                                                                                                       she had cardiac cath showed minimal luminal irregularities of coronaries  ,possible coronary spasm causing her symptoms, patient was discharged, her pain resolved, the pain she had was different from fibromyalgia symptoms .  hospital records reviewed from [facility]   Patient lately tired , exhausted  , insomnia lately , hx snoring ,  patient blood pressure is normal on amlodipine ,  patient was started pitivastatin 1 mg , ( patient was not taking any medications for HTN HLD prior to the event )    follow up  blood work in hospital     Hdl 43  , TSH 7.31 Hba1c 5.8   hemoglobin 11.7/36.2 follow up blood work  on medication   , HDL 50 ,  , LDL 86  on medication

## 2024-05-30 NOTE — ASSESSMENT
[FreeTextEntry1] : Patient with above  recent NSTEMI : likely vasospastic  minimal luminal irregularities : advised to take ecotrin , will give low dose atentolol 12.5 mg po daily , ecotrin 81 mg po daily , continue amlodpine 5 mg po daily , statin , will obtain her echo /CT chest repoert from [facility] ( did not receive reports yet after request )   Fatigue : non specific , likely from insomnia ,  advised to take tylenol pm ,   HTN:  controlled , continue low salt diet  norvasc 5 mg po daily   Hyperlipidemia : improved  started on livalo 1 mg po daily ,   Hypothyroidism   elevated TSH  Increase Synthroid to 100 mcg   fibromyalgia :with anxiety depression   continue medication

## 2024-06-07 ENCOUNTER — OUTPATIENT (OUTPATIENT)
Dept: OUTPATIENT SERVICES | Facility: HOSPITAL | Age: 64
LOS: 1 days | End: 2024-06-07
Payer: COMMERCIAL

## 2024-06-07 DIAGNOSIS — G47.33 OBSTRUCTIVE SLEEP APNEA (ADULT) (PEDIATRIC): ICD-10-CM

## 2024-06-07 PROCEDURE — 95800 SLP STDY UNATTENDED: CPT | Mod: 26

## 2024-06-07 PROCEDURE — 95800 SLP STDY UNATTENDED: CPT

## 2024-07-29 ENCOUNTER — APPOINTMENT (OUTPATIENT)
Dept: CARDIOLOGY | Facility: CLINIC | Age: 64
End: 2024-07-29

## 2024-08-29 ENCOUNTER — APPOINTMENT (OUTPATIENT)
Dept: CARDIOLOGY | Facility: CLINIC | Age: 64
End: 2024-08-29

## 2024-10-03 ENCOUNTER — APPOINTMENT (OUTPATIENT)
Dept: CARDIOLOGY | Facility: CLINIC | Age: 64
End: 2024-10-03
Payer: COMMERCIAL

## 2024-10-03 ENCOUNTER — NON-APPOINTMENT (OUTPATIENT)
Age: 64
End: 2024-10-03

## 2024-10-03 VITALS
BODY MASS INDEX: 32.25 KG/M2 | WEIGHT: 182 LBS | OXYGEN SATURATION: 95 % | DIASTOLIC BLOOD PRESSURE: 74 MMHG | SYSTOLIC BLOOD PRESSURE: 128 MMHG | HEART RATE: 75 BPM | HEIGHT: 63 IN

## 2024-10-03 DIAGNOSIS — I22.2 SUBSEQUENT NON-ST ELEVATION (NSTEMI) MYOCARDIAL INFARCTION: ICD-10-CM

## 2024-10-03 DIAGNOSIS — M79.7 FIBROMYALGIA: ICD-10-CM

## 2024-10-03 DIAGNOSIS — Z01.810 ENCOUNTER FOR PREPROCEDURAL CARDIOVASCULAR EXAMINATION: ICD-10-CM

## 2024-10-03 DIAGNOSIS — E78.2 MIXED HYPERLIPIDEMIA: ICD-10-CM

## 2024-10-03 DIAGNOSIS — G47.30 SLEEP APNEA, UNSPECIFIED: ICD-10-CM

## 2024-10-03 DIAGNOSIS — I10 ESSENTIAL (PRIMARY) HYPERTENSION: ICD-10-CM

## 2024-10-03 DIAGNOSIS — G47.33 OBSTRUCTIVE SLEEP APNEA (ADULT) (PEDIATRIC): ICD-10-CM

## 2024-10-03 DIAGNOSIS — R07.89 OTHER CHEST PAIN: ICD-10-CM

## 2024-10-03 PROCEDURE — G2211 COMPLEX E/M VISIT ADD ON: CPT | Mod: NC

## 2024-10-03 PROCEDURE — 93000 ELECTROCARDIOGRAM COMPLETE: CPT | Mod: NC

## 2024-10-03 PROCEDURE — 99214 OFFICE O/P EST MOD 30 MIN: CPT

## 2024-10-03 NOTE — ASSESSMENT
[FreeTextEntry1] : Patient with above  s/p  NSTEMI : likely vasospastic minimal luminal irregularities: advised to take ecotrin , will give low dose atenolol 12.5 mg po daily ,Ecotrin 81 mg po daily , continue amlodipine 5 mg po daily ,statin ,   will obtain echo to assess ventricular function,     (will obtain her echo /CT chest reports from [facility] ( did not receive reports yet after request )   Fatigue: nonspecific, likely from  severe sleep apnea , and  from insomnia due to body aches ( did  not get worse with statin )   advised to take tylenol pm ,   HTN:  controlled , continue low salt diet  norvasc 5 mg po daily   Hyperlipidemia : improved   tolerating  livalo 1 mg po daily ,   Severe sleep apnea : advised to follow up sleep specialist ,   Hypothyroidism   Improved TSH  continue  Synthroid to 100 mcg   fibromyalgia :with anxiety depression   continue medication   patient with above hx , who has chronic atypical chest pain , back pain ,  non obstructive  CAD On cath  without exertional symptoms who is stable and optimal for low risk procedure   advised to follow up with sleep specialist

## 2024-10-03 NOTE — ASSESSMENT
[FreeTextEntry1] : Patient with above  s/p  NSTEMI : likely vasospastic minimal luminal irregularities: advised to take ecotrin , will give low dose atenolol 12.5 mg po daily ,Ecotrin 81 mg po daily , continue amlodipine 5 mg po daily ,statin ,   will obtain echo to assess ventricular function,     (will obtain her echo /CT chest reports from [facility] ( did not receive reports yet after request )   Fatigue: nonspecific, likely from  severe sleep apnea , and  from insomnia due to body aches ( did  not get worse with statin )   advised to take tylenol pm ,   HTN:  controlled , continue low salt diet  norvasc 5 mg po daily   Hyperlipidemia : improved   tolerating  livalo 1 mg po daily ,   Severe sleep apnea : advised to follow up sleep specialist ,   Hypothyroidism   Improved TSH  continue  Synthroid to 100 mcg   fibromyalgia :with anxiety depression   continue medication   patient with above hx , who has chronic atypical chest pain , back pain ,  non obstructive  CAD On cath  without exertional symptoms who is stable and optimal for low risk procedure   advised to follow up with sleep specialist

## 2024-10-03 NOTE — HISTORY OF PRESENT ILLNESS
[FreeTextEntry1] : 64 year old female with anxiety depression fibromyalgia ,hypertension , hyperlipidemia was not taking medication ,NSTEMI ,came for follow up   and wants pre op cardiac evaluation for endoscopy ,   says she does have spams in chest last for few seconds ,  has chronic  back pain mild mid thoracic are for which she undergoing  work up , worse at night time , some time after eating meals ,not related to exertion at all , patient scheduled to have endoscopy , also  being followed by spine surgeon ,   patient denies any exertional chest pain or shortness of breath or dizziness or palpitations . Patient had sleep study showed severe sleep apnea                                                                                                                        she had cardiac cath showed minimal luminal irregularities of coronaries  ,possible coronary spasm causing her symptoms, patient was discharged, her pain resolved, the pain she had was different from fibromyalgia symptoms .  hospital records reviewed from [facility]   Patient lately tired , exhausted  , insomnia lately , hx snoring ,  patient blood pressure is normal on amlodipine ,  patient was started pitivastatin 1 mg , ( patient was not taking any medications for HTN HLD prior to the event )    follow up  blood work in hospital     Hdl 43  , TSH 7.31 Hba1c 5.8   hemoglobin 11.7/36.2 follow up blood work  on medication   , HDL 50 ,  , LDL 86  on medication

## 2024-10-03 NOTE — HISTORY OF PRESENT ILLNESS
[FreeTextEntry1] : 64 year old female with anxiety depression fibromyalgia ,hypertension , hyperlipidemia was not taking medication ,NSTEMI ,came for follow up   and wants pre op cardiac evaluation for endoscopy ,   says she does have spams in chest last for few seconds ,  has chronic  back pain mild mid thoracic are for which she undergoing  work up , worse at night time , some time after eating meals ,not related to exertion at all , patient scheduled to have endoscopy , also  being followed by spine surgeon ,   patient denies any exertional chest pain or shortness of breath or dizziness or palpitations . Patient had sleep study showed severe sleep apnea                                                                                                                        she had cardiac cath showed minimal luminal irregularities of coronaries  ,possible coronary spasm causing her symptoms, patient was discharged, her pain resolved, the pain she had was different from fibromyalgia symptoms .  hospital records reviewed from [facility]   Patient lately tired , exhausted  , insomnia lately , hx snoring ,  patient blood pressure is normal on amlodipine ,  patient was started pitivastatin 1 mg , ( patient was not taking any medications for HTN HLD prior to the event )    follow up  blood work in hospital     Hdl 43  , TSH 7.31 Hba1c 5.8   hemoglobin 11.7/36.2 follow up blood work  on medication   , HDL 50 ,  , LDL 86  on medication

## 2024-10-03 NOTE — HISTORY OF PRESENT ILLNESS
[FreeTextEntry1] : 64 year old female with anxiety depression fibromyalgia ,hypertension , hyperlipidemia was not taking medication ,NSTEMI ,came for follow up   and wants pre op cardiac evaluation for endoscopy ,   says she does have spams in chest last for few seconds ,  has chronic  back pain mild mid thoracic are for which she undergoing  work up , worse at night time , some time after eating meals ,not related to exertion at all , patient scheduled to have endoscopy , also  being followed by spine surgeon ,   patient denies any exertional chest pain or shortness of breath or dizziness or palpitations . Patient had sleep study showed severe sleep apnea                                                                                                                        she had cardiac cath showed minimal luminal irregularities of coronaries  ,possible coronary spasm causing her symptoms, patient was discharged, her pain resolved, the pain she had was different from fibromyalgia symptoms .  hospital records reviewed from [facility]   Patient lately tired , exhausted  , insomnia lately , hx snoring ,  patient blood pressure is normal on amlodipine ,  patient was started pitivastatin 1 mg , ( patient was not taking any medications for HTN HLD prior to the event )    follow up  blood work in hospital     Hdl 43  , TSH 7.31 Hba1c 5.8   hemoglobin 11.7/36.2 follow up blood work  on medication   , HDL 50 ,  , LDL 86  on medication

## 2024-10-03 NOTE — ASSESSMENT
[FreeTextEntry1] : Patient with above  s/p  NSTEMI : likely vasospastic minimal luminal irregularities: advised to take ecotrin , will give low dose atenolol 12.5 mg po daily ,Ecotrin 81 mg po daily , continue amlodipine 5 mg po daily ,statin ,   will obtain echo to assess ventricular function,     (will obtain her echo /CT chest reports from [facility] ( did not receive reports yet after request )   Fatigue: nonspecific, likely from  severe sleep apnea , and  from insomnia due to body aches ( did  not get worse with statin )   advised to take tylenol pm ,   HTN:  controlled , continue low salt diet  norvasc 5 mg po daily   Hyperlipidemia : improved   tolerating  livalo 1 mg po daily ,   Severe sleep apnea : advised to follow up sleep specialist ,   Hypothyroidism   Improved TSH  continue  Synthroid to 100 mcg   fibromyalgia :with anxiety depression   continue medication   patient with above hx , who has chronic atypical chest pain , back pain ,  non obstructive  CAD On cath  without exertional symptoms who is stable and optimal for low risk procedure   advised to follow up with sleep specialist

## 2024-10-11 ENCOUNTER — APPOINTMENT (OUTPATIENT)
Dept: CARDIOLOGY | Facility: CLINIC | Age: 64
End: 2024-10-11

## 2025-01-09 ENCOUNTER — APPOINTMENT (OUTPATIENT)
Dept: CARDIOLOGY | Facility: CLINIC | Age: 65
End: 2025-01-09
Payer: COMMERCIAL

## 2025-01-09 VITALS
HEART RATE: 80 BPM | BODY MASS INDEX: 32.07 KG/M2 | WEIGHT: 181 LBS | OXYGEN SATURATION: 95 % | DIASTOLIC BLOOD PRESSURE: 80 MMHG | SYSTOLIC BLOOD PRESSURE: 132 MMHG | HEIGHT: 63 IN

## 2025-01-09 VITALS — HEART RATE: 80 BPM | DIASTOLIC BLOOD PRESSURE: 80 MMHG | SYSTOLIC BLOOD PRESSURE: 126 MMHG | RESPIRATION RATE: 16 BRPM

## 2025-01-09 DIAGNOSIS — I22.2 SUBSEQUENT NON-ST ELEVATION (NSTEMI) MYOCARDIAL INFARCTION: ICD-10-CM

## 2025-01-09 DIAGNOSIS — F32.A ANXIETY DISORDER, UNSPECIFIED: ICD-10-CM

## 2025-01-09 DIAGNOSIS — F41.9 ANXIETY DISORDER, UNSPECIFIED: ICD-10-CM

## 2025-01-09 DIAGNOSIS — E78.2 MIXED HYPERLIPIDEMIA: ICD-10-CM

## 2025-01-09 DIAGNOSIS — G47.33 OBSTRUCTIVE SLEEP APNEA (ADULT) (PEDIATRIC): ICD-10-CM

## 2025-01-09 DIAGNOSIS — I10 ESSENTIAL (PRIMARY) HYPERTENSION: ICD-10-CM

## 2025-01-09 PROCEDURE — 93000 ELECTROCARDIOGRAM COMPLETE: CPT

## 2025-01-09 PROCEDURE — G2211 COMPLEX E/M VISIT ADD ON: CPT | Mod: NC

## 2025-01-09 PROCEDURE — 99214 OFFICE O/P EST MOD 30 MIN: CPT

## 2025-04-10 ENCOUNTER — APPOINTMENT (OUTPATIENT)
Dept: CARDIOLOGY | Facility: CLINIC | Age: 65
End: 2025-04-10